# Patient Record
Sex: MALE | Race: WHITE | NOT HISPANIC OR LATINO | Employment: FULL TIME | URBAN - METROPOLITAN AREA
[De-identification: names, ages, dates, MRNs, and addresses within clinical notes are randomized per-mention and may not be internally consistent; named-entity substitution may affect disease eponyms.]

---

## 2023-04-19 LAB
ALANINE AMINOTRANSFERASE (SGPT) (U/L) IN SER/PLAS: 35 U/L (ref 10–52)
ALBUMIN (G/DL) IN SER/PLAS: 5 G/DL (ref 3.4–5)
ALKALINE PHOSPHATASE (U/L) IN SER/PLAS: 50 U/L (ref 33–120)
ANION GAP IN SER/PLAS: 13 MMOL/L (ref 10–20)
ASPARTATE AMINOTRANSFERASE (SGOT) (U/L) IN SER/PLAS: 33 U/L (ref 9–39)
BILIRUBIN TOTAL (MG/DL) IN SER/PLAS: 0.9 MG/DL (ref 0–1.2)
CALCIUM (MG/DL) IN SER/PLAS: 10.2 MG/DL (ref 8.6–10.6)
CARBON DIOXIDE, TOTAL (MMOL/L) IN SER/PLAS: 32 MMOL/L (ref 21–32)
CHLORIDE (MMOL/L) IN SER/PLAS: 99 MMOL/L (ref 98–107)
CHOLESTEROL (MG/DL) IN SER/PLAS: 204 MG/DL (ref 0–199)
CHOLESTEROL IN HDL (MG/DL) IN SER/PLAS: 59.2 MG/DL
CHOLESTEROL/HDL RATIO: 3.4
CREATININE (MG/DL) IN SER/PLAS: 1.19 MG/DL (ref 0.5–1.3)
ESTIMATED AVERAGE GLUCOSE FOR HBA1C: 108 MG/DL
GFR MALE: 81 ML/MIN/1.73M2
GLUCOSE (MG/DL) IN SER/PLAS: 87 MG/DL (ref 74–99)
HEMOGLOBIN A1C/HEMOGLOBIN TOTAL IN BLOOD: 5.4 %
LDL: 135 MG/DL (ref 0–99)
POTASSIUM (MMOL/L) IN SER/PLAS: 4.5 MMOL/L (ref 3.5–5.3)
PROTEIN TOTAL: 7.7 G/DL (ref 6.4–8.2)
SODIUM (MMOL/L) IN SER/PLAS: 139 MMOL/L (ref 136–145)
TRIGLYCERIDE (MG/DL) IN SER/PLAS: 50 MG/DL (ref 0–149)
UREA NITROGEN (MG/DL) IN SER/PLAS: 20 MG/DL (ref 6–23)
VLDL: 10 MG/DL (ref 0–40)

## 2023-12-19 ENCOUNTER — OFFICE VISIT (OUTPATIENT)
Dept: PRIMARY CARE | Facility: CLINIC | Age: 38
End: 2023-12-19
Payer: COMMERCIAL

## 2023-12-19 VITALS
RESPIRATION RATE: 16 BRPM | WEIGHT: 202.38 LBS | BODY MASS INDEX: 28.97 KG/M2 | SYSTOLIC BLOOD PRESSURE: 122 MMHG | OXYGEN SATURATION: 98 % | DIASTOLIC BLOOD PRESSURE: 80 MMHG | HEIGHT: 70 IN | HEART RATE: 56 BPM

## 2023-12-19 DIAGNOSIS — Z90.5 HISTORY OF NEPHRECTOMY: Primary | ICD-10-CM

## 2023-12-19 DIAGNOSIS — G57.90 NEUROPATHY OF LOWER EXTREMITY, UNSPECIFIED LATERALITY: ICD-10-CM

## 2023-12-19 PROCEDURE — 99213 OFFICE O/P EST LOW 20 MIN: CPT | Performed by: INTERNAL MEDICINE

## 2023-12-19 PROCEDURE — 1036F TOBACCO NON-USER: CPT | Performed by: INTERNAL MEDICINE

## 2023-12-19 NOTE — PROGRESS NOTES
"Office Visit / leg leg tingling     Darryl is a very pleasant 38-year-old male here for 2 separate issues.  He has history of right nephrectomy many years ago, he exercises, hydrates, takes only occasional plant-based protein supplements but no daily protein supplements.  He wants to get his kidney function checked twice a year for screening.  Does not take any NSAIDs.    His second concern is tingling of the left leg.  He does a lot of exercising including weightlifting, running, he has noticed that days that he exercises he has some tingling of the lateral thigh.  This comes after exercise.  No symptoms during exercise.  Has no symptoms days that he does not exercise.  Denies any joint pain.  No muscle aches.  No weakness.  No numbness.  No back pain no neck pain.  No upper extremity numbness tingling weakness.  Otherwise he is doing well and has no complaints.  Eats a healthy diet.  Exercises.    No fevers no chills, no weight changes.    No URI symptoms    No cough no shortness of breath    No chest pain no palpitations    Appetite intact, no abdominal pain.    No new or unusual headaches.    Vitals and exam:Blood pressure 122/80, pulse 56, resp. rate 16, height 1.778 m (5' 10\"), weight 91.8 kg (202 lb 6.1 oz), SpO2 98 %.  Calm coherent and appropriate    Neck is supple and none tender    Breathing comfortably, clear to auscultation bilaterally    Regular rate rhythm, no murmur gallops or rubs.  Good distal pulses.  No edema.  No JVD.    Abdomen soft and nontender, normal bowel sounds.    Extremities warm well perfused with no clubbing or cyanosis    Cognition intact.    Sensation to fine touch and pain is intact in both legs and hands arms.  Strength intact upper and lower extremities.  Patellar reflex 1+ bilaterally.  He does have poor hip extension and flexion mobility.  Otherwise joints intact.  Cranial nerves II through XII intact.    Assessment plan: Darryl is a pleasant 38-year-old male with history of " right nephrectomy who is here for follow-up:    History of nephrectomy: Has a very healthy lifestyle, hydrates, does not take any NSAIDs, I think it is perfectly fine to check his kidney function twice a year for his own reassurance otherwise once a year will also suffice.    Subjective tingling: Most consistent with nerve impingement due to poor hip mobility.  No concern for neurodeficit based on exam.  We discussed doing some hip mobility exercises at home, if symptoms do not resolve he will call and we will refer to physical therapy.  Call with any new symptoms.

## 2024-01-03 ENCOUNTER — TELEPHONE (OUTPATIENT)
Dept: PRIMARY CARE | Facility: CLINIC | Age: 39
End: 2024-01-03
Payer: COMMERCIAL

## 2024-01-05 ENCOUNTER — PHARMACY VISIT (OUTPATIENT)
Dept: PHARMACY | Facility: CLINIC | Age: 39
End: 2024-01-05
Payer: COMMERCIAL

## 2024-01-05 PROCEDURE — RXMED WILLOW AMBULATORY MEDICATION CHARGE

## 2024-01-05 RX ORDER — METHYLPREDNISOLONE 4 MG/1
TABLET ORAL
Qty: 21 TABLET | Refills: 0 | OUTPATIENT
Start: 2024-01-05

## 2024-01-05 RX ORDER — AMOXICILLIN 875 MG/1
875 TABLET, FILM COATED ORAL 2 TIMES DAILY
Qty: 20 TABLET | Refills: 0 | OUTPATIENT
Start: 2024-01-05

## 2024-01-13 ENCOUNTER — ANCILLARY PROCEDURE (OUTPATIENT)
Dept: RADIOLOGY | Facility: CLINIC | Age: 39
End: 2024-01-13
Payer: COMMERCIAL

## 2024-01-13 DIAGNOSIS — R05.9 COUGH, UNSPECIFIED TYPE: ICD-10-CM

## 2024-01-13 PROCEDURE — 71046 X-RAY EXAM CHEST 2 VIEWS: CPT

## 2024-01-13 PROCEDURE — 71046 X-RAY EXAM CHEST 2 VIEWS: CPT | Performed by: RADIOLOGY

## 2024-01-19 ENCOUNTER — OFFICE VISIT (OUTPATIENT)
Dept: PRIMARY CARE | Facility: CLINIC | Age: 39
End: 2024-01-19
Payer: COMMERCIAL

## 2024-01-19 VITALS — RESPIRATION RATE: 14 BRPM | OXYGEN SATURATION: 98 % | HEART RATE: 64 BPM

## 2024-01-19 DIAGNOSIS — J18.9 COMMUNITY ACQUIRED PNEUMONIA, UNSPECIFIED LATERALITY: Primary | ICD-10-CM

## 2024-01-19 PROCEDURE — 1036F TOBACCO NON-USER: CPT | Performed by: INTERNAL MEDICINE

## 2024-01-19 PROCEDURE — 99213 OFFICE O/P EST LOW 20 MIN: CPT | Performed by: INTERNAL MEDICINE

## 2024-01-19 NOTE — PROGRESS NOTES
Darryl is a pleasant 38-year-old male who had URI symptoms around [place], this progressed to pneumonia, seen in urgent care, treated with steroids, Z-Yon and Augmentin, he just finished a Z-Yon and is a little of his Augmentin.  He is feeling much better.  His cough is much better.  Initially he had a lot of shortness of breath that is now resolved.  No chest pain.  No fevers no chills.  Energy coming back to normal.  He wants to know if he can start exercising.  No sinus pain no sore throat no headache no neck pain.  Appetite intact.  No nausea vomiting diarrhea.  No dizziness lightheadedness.  No night sweats.    Pulse 64, resp. rate 14, SpO2 98 %.  Blood pressure 120/68.    Coherent, well appearing,     Neck is supple and none tender, oropharynx clear, sinuses nontender    Breathing comfortably, clear to auscultation bilaterally, breathing comfortably     Regular rate rhythm, no murmur gallops or rubs.  Good distal pulses.  No edema.  No JVD.    Abdomen soft and nontender, normal bowel sounds.    No cervical  lymphadenopathy.    Extremities warm well perfused with no clubbing or cyanosis    Cognition intact.    Assessment plan: Darryl is a pleasant 38-year-old male who had a viral URI, complicated by community-acquired pneumonia treated with steroids and Z-Yon and Augmentin, he is currently finishing his Augmentin.  He is feeling much better.  Exam is reassuring.  May resume normal activity gradually as tolerated.

## 2024-02-12 DIAGNOSIS — Z30.09 VASECTOMY EVALUATION: Primary | ICD-10-CM

## 2024-02-22 ENCOUNTER — LAB (OUTPATIENT)
Dept: LAB | Facility: LAB | Age: 39
End: 2024-02-22
Payer: COMMERCIAL

## 2024-02-22 ENCOUNTER — OFFICE VISIT (OUTPATIENT)
Dept: UROLOGY | Facility: HOSPITAL | Age: 39
End: 2024-02-22
Payer: COMMERCIAL

## 2024-02-22 DIAGNOSIS — Z30.09 VASECTOMY EVALUATION: ICD-10-CM

## 2024-02-22 DIAGNOSIS — Z30.09 VASECTOMY EVALUATION: Primary | ICD-10-CM

## 2024-02-22 LAB
ANION GAP SERPL CALC-SCNC: 13 MMOL/L (ref 10–20)
BUN SERPL-MCNC: 26 MG/DL (ref 6–23)
CALCIUM SERPL-MCNC: 9.7 MG/DL (ref 8.6–10.3)
CHLORIDE SERPL-SCNC: 100 MMOL/L (ref 98–107)
CO2 SERPL-SCNC: 28 MMOL/L (ref 21–32)
CREAT SERPL-MCNC: 0.99 MG/DL (ref 0.5–1.3)
EGFRCR SERPLBLD CKD-EPI 2021: >90 ML/MIN/1.73M*2
GLUCOSE SERPL-MCNC: 88 MG/DL (ref 74–99)
POC APPEARANCE, URINE: CLEAR
POC BILIRUBIN, URINE: NEGATIVE
POC BLOOD, URINE: ABNORMAL
POC COLOR, URINE: YELLOW
POC GLUCOSE, URINE: NEGATIVE MG/DL
POC KETONES, URINE: NEGATIVE MG/DL
POC LEUKOCYTES, URINE: NEGATIVE
POC NITRITE,URINE: NEGATIVE
POC PH, URINE: 6 PH
POC PROTEIN, URINE: NEGATIVE MG/DL
POC SPECIFIC GRAVITY, URINE: 1.02
POC UROBILINOGEN, URINE: 0.2 EU/DL
POTASSIUM SERPL-SCNC: 4.2 MMOL/L (ref 3.5–5.3)
SODIUM SERPL-SCNC: 137 MMOL/L (ref 136–145)

## 2024-02-22 PROCEDURE — 99214 OFFICE O/P EST MOD 30 MIN: CPT | Performed by: STUDENT IN AN ORGANIZED HEALTH CARE EDUCATION/TRAINING PROGRAM

## 2024-02-22 PROCEDURE — 81003 URINALYSIS AUTO W/O SCOPE: CPT | Mod: 91 | Performed by: STUDENT IN AN ORGANIZED HEALTH CARE EDUCATION/TRAINING PROGRAM

## 2024-02-22 PROCEDURE — 80048 BASIC METABOLIC PNL TOTAL CA: CPT

## 2024-02-22 PROCEDURE — 99204 OFFICE O/P NEW MOD 45 MIN: CPT | Performed by: STUDENT IN AN ORGANIZED HEALTH CARE EDUCATION/TRAINING PROGRAM

## 2024-02-22 PROCEDURE — 1036F TOBACCO NON-USER: CPT | Performed by: STUDENT IN AN ORGANIZED HEALTH CARE EDUCATION/TRAINING PROGRAM

## 2024-02-22 PROCEDURE — 36415 COLL VENOUS BLD VENIPUNCTURE: CPT

## 2024-02-22 NOTE — PROGRESS NOTES
Subjective   Patient ID: Darryl Payne is a 38 y.o. male    HPI  38 y.o. 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 He reports no recent issues with the remaining kidney but has not had recent renal ultrasound or kidney function tests. The patient is considering a vasectomy and is seeking counseling on the procedure. He has erectile dysfunction or other urological complaints. He denies any use of NSAIDs, anabolic steroids, or creatine. He maintains good hydration with at least 100 ounces of water daily. The patient also reports a severe illness in January, during which he alternated Tylenol and Advil to manage fever. He is currently not on any blood pressure medication and has no known drug allergies.  the patient is  with 2 daughters, I had a long and extensive discussion with the patient regarding vasectomy. I informed him that he should consider this procedure as permanent and he should be 100% sure about proceeding with it. I explained to him in detail the steps of the vasectomy, I also had a long discussion with him regarding the possible side effects including infection, hematoma, bleeding, chronic pain, testicular congestion, injury to surrounding structure. We also discussed the multiple studies linking vasectomy to prostate cancer I explained to him the correlation between this procedure and prostate cancer. The patient verbalized understanding of all the risks and benefits and would like to proceed. I explained to him that there is a small chance of spontaneous return of the semen because of reconnection of his vas ending. I also told him that he is not close to the sterile unless it is proven by a post vasectomy semen analysis after around 3 months. I also explained to him that some man with a much longer to clear his semen from there, this most could take  up to 6 months during which he would be still able to proceed.     The most recent urinalysis, conducted on 2/22/2024, revealed Trace intact blood     Review of Systems    All systems were reviewed. Anything negative was noted in the HPI.    Objective   Physical Exam    General: Well developed, well nourished, alert and cooperative, appears in no acute distress   Eyes: Non-injected conjunctiva, sclera clear, no proptosis   Cardiac: Extremities are warm and well perfused. No edema, cyanosis or pallor   Lungs: Breathing is easy, non-labored. Speaking in clear and complete sentences. Normal diaphragmatic movement   MSK: Ambulatory with steady gait, unassisted   Neuro: Alert and oriented to person, place, and time   Psych: Demonstrates good judgment and reason, without hallucinations, abnormal affect or abnormal behaviors   Skin: No obvious lesions, no rashes       No CVA tenderness bilaterally   No suprapubic pain or discomfort   Normal genitalia , normal testicular exam, normal JULIETTE    Past Medical History:   Diagnosis Date    Contact with and (suspected) exposure to covid-19 08/05/2020    Close exposure to COVID-19 virus    Partial loss of teeth, unspecified cause, unspecified class     [place] teeth removed    Unspecified abdominal pain 06/23/2020    Abdominal pain of multiple sites    Unspecified asthma, uncomplicated     Childhood asthma    Viral infection, unspecified 08/05/2020    Nonspecific syndrome suggestive of viral illness         Past Surgical History:   Procedure Laterality Date    OTHER SURGICAL HISTORY  07/22/2019    Kidney surgery    OTHER SURGICAL HISTORY  04/04/2022    Nephrectomy       Assessment/Plan   Vasectomy counseling, Erectile dysfunction, Uninephric, carrier of high risk genes for malignancy     38 y.o. male who presents for the above conditions,  1- Vasectomy Evaluation: Patient presents today for evaluation of vasectomy. We had an extensive discussion about the procedure and post-procedure  protocol. We discussed that vasectomy is intended to be a permanent form of contraception. There are options for fertility post vasectomy, including vasectomy reversal and sperm retrieval but these are not always successful and can be rather expensive.  We highlighted that it is not an immediate form of contraception, and that another form is required until vas occlusion is confirmed with a post-vasectomy semen analysis. We recommend that the patient refrain from ejaculation for 1 week post-procedure and we would be checking a post-vasectomy semen analysis in 2-3 months, or 15-20 ejaculates. Need for repeat vasectomy is very low, <1%. There is a very small risk for pregnancy after vasectomy (1 in 2,000). We define a successful vasectomy by achieving azoospermia or less than 100,000 non-motile sperm on post-vasectomy semen analysis.  We discussed that the procedure would be done in the office under local anesthesia. Complications were discussed including but not limited to pain, which can be chronic in nature, bleeding/hematoma formation, and infection. We recommended ice and rest for the next 48-72hrs. Patient may be prescribed an anti-inflammatory for pain control. Patient is instructed to refrain from strenuous activity for 2 weeks.  No barriers to learning were identified. After all of the patient’s questions were satisfactorily answered, he expressed understanding of the risks of surgery and wishes to proceed with vasectomy.    2- Erectile dysfunction: We had a very long and extensive discussion with the patient regarding the pathophysiology, differential diagnosis, risk factor, and management of ED. We discussed at length mechanism of action, risk, benefit, potential complication. Pt decided to not do anything for his ED.     Plan:  - Renal US for left kidney  - BMP  - Schedule for vasectomy              Scribe Attestation  By signing my name below, IZachary Scribe   attest that this documentation has  been prepared under the direction and in the presence of Dr. Benny Wall

## 2024-02-29 ENCOUNTER — PROCEDURE VISIT (OUTPATIENT)
Dept: UROLOGY | Facility: HOSPITAL | Age: 39
End: 2024-02-29
Payer: COMMERCIAL

## 2024-02-29 DIAGNOSIS — Z30.09 VASECTOMY EVALUATION: ICD-10-CM

## 2024-02-29 DIAGNOSIS — Z98.52 STATUS POST VASECTOMY: Primary | ICD-10-CM

## 2024-02-29 PROCEDURE — 55250 REMOVAL OF SPERM DUCT(S): CPT | Performed by: STUDENT IN AN ORGANIZED HEALTH CARE EDUCATION/TRAINING PROGRAM

## 2024-02-29 PROCEDURE — 88302 TISSUE EXAM BY PATHOLOGIST: CPT | Performed by: PATHOLOGY

## 2024-02-29 PROCEDURE — 88302 TISSUE EXAM BY PATHOLOGIST: CPT | Mod: TC,SUR,AHULAB | Performed by: STUDENT IN AN ORGANIZED HEALTH CARE EDUCATION/TRAINING PROGRAM

## 2024-02-29 NOTE — PROGRESS NOTES
Patient ID: Darryl Payne is a 38 y.o. male.    Vasectomy    Date/Time: 2/29/2024 2:06 PM    Performed by: Benny Wall MD MPH  Authorized by: Benny Wall MD MPH      Procedure discussed: discussed risks, benefits, and alternatives    Chaperone present: no    Timeout: timeout called immediately prior to procedure    Prep: patient was prepped and draped in usual sterile fashion    Prep type: Betadine    Anesthesia: local anesthesia used    Local anesthetic: lidocaine without epinephrine    Procedure Details:     Indications: desire for sterilization      Laterality: bilateral      Incisions: 2      Right Vas Deferens:      Divided: yes        Hemoclips applied: yes        Cauterized: yes         Left Vas Deferens:      Divided: yes        Hemoclips applied: yes        Cauterized: yes          Skin closure: suture     Closure suture type: 3-0 chromic gut    Post-Procedure Details:     Pathology sent to lab for analysis: yes      Outcome: patient tolerated procedure well with no complications      Post-procedure interventions: sterile dressing applied and wound care instructions given      Dressing type: antibiotic ointment and scrotal support      Plan  Fu in 12 weeks with post Vas SA.

## 2024-03-05 ENCOUNTER — HOSPITAL ENCOUNTER (OUTPATIENT)
Dept: RADIOLOGY | Facility: HOSPITAL | Age: 39
Discharge: HOME | End: 2024-03-05
Payer: COMMERCIAL

## 2024-03-05 DIAGNOSIS — Z30.09 VASECTOMY EVALUATION: ICD-10-CM

## 2024-03-05 LAB
LABORATORY COMMENT REPORT: NORMAL
PATH REPORT.FINAL DX SPEC: NORMAL
PATH REPORT.GROSS SPEC: NORMAL
PATH REPORT.RELEVANT HX SPEC: NORMAL
PATH REPORT.TOTAL CANCER: NORMAL

## 2024-03-05 PROCEDURE — 76770 US EXAM ABDO BACK WALL COMP: CPT

## 2024-03-08 ENCOUNTER — TELEPHONE (OUTPATIENT)
Dept: UROLOGY | Facility: HOSPITAL | Age: 39
End: 2024-03-08
Payer: COMMERCIAL

## 2024-03-08 NOTE — TELEPHONE ENCOUNTER
----- Message from Livier Jasmine MA sent at 3/8/2024  8:44 AM EST -----  Regarding: FW: Vasectomy follow up   Contact: [phone]    ----- Message -----  From: Darryl Payne  Sent: 3/8/2024   8:21 AM EST  To: [facility] Clinical Support Staff  Subject: Vasectomy follow up                              Morning!  Recovery has been going well 1 week out but had a question.      Left side of the scrotum feels ~90% back to normal.  Right side maybe 60-70% and still feels sore and more swollen than left with a discomfort that kind of feels similar to chafing even though I’m not actually chafing; my assumption is thats not abnormal one week out?    No bleeding observed after the first 24-48 hours and I am going to avoid lifting or running for a few more days since both seem like they would be uncomfortable.    Thanks!  Have a good weekend

## 2024-05-28 ENCOUNTER — PATIENT MESSAGE (OUTPATIENT)
Dept: UROLOGY | Facility: HOSPITAL | Age: 39
End: 2024-05-28
Payer: COMMERCIAL

## 2024-05-28 DIAGNOSIS — N52.9 ERECTILE DYSFUNCTION, UNSPECIFIED ERECTILE DYSFUNCTION TYPE: Primary | ICD-10-CM

## 2024-05-28 RX ORDER — TADALAFIL 5 MG/1
5 TABLET ORAL DAILY
Qty: 30 TABLET | Refills: 2 | Status: SHIPPED | OUTPATIENT
Start: 2024-05-28 | End: 2024-08-26

## 2024-05-29 ENCOUNTER — ANCILLARY PROCEDURE (OUTPATIENT)
Dept: ENDOCRINOLOGY | Facility: CLINIC | Age: 39
End: 2024-05-29
Payer: COMMERCIAL

## 2024-05-29 DIAGNOSIS — Z98.52 STATUS POST VASECTOMY: ICD-10-CM

## 2024-05-29 DIAGNOSIS — N52.9 ERECTILE DYSFUNCTION, UNSPECIFIED ERECTILE DYSFUNCTION TYPE: Primary | ICD-10-CM

## 2024-05-29 LAB
ABSTINENCE (DAYS): 3 DAYS (ref 2–7)
AGGLUTINATION (SEMEN): NO
ANALYZED TIME:: ABNORMAL
ANDROLOGY LAB ID#: ABNORMAL
CLUMPS (SEMEN): NO
COLLECTED COMPLETELY: YES
COLLECTION LOCATION:: ABNORMAL
COLLECTION METHOD:: ABNORMAL
CONCENTRATION CN (POST-WASH): 0 MILL/ML
CONCENTRATION(SEMEN): 0 MILL/ML (ref 15–?)
DEBRIS (SEMEN): YES
LEUKOCYTE (SEMEN): ABNORMAL
NON PROG. MOTILITY (SEMEN): 0 %
NON PROG. MOTILITY CN (POST-WASH): 0 %
PROG. MOTILITY (SEMEN): 0 % (ref 32–?)
PROG. MOTILITY CN (POST-WASH): 0 %
RECEIVED TIME:: ABNORMAL
SEMEN APPEARANCE: NORMAL
SEMEN LIQUEFACTION: NORMAL
SEMEN VISCOSITY: ABNORMAL
TOTAL MOTILITY (SEMEN): 0 % (ref 40–?)
TOTAL MOTILITY CN (POST-WASH): 0 %
TOTAL NO OF MOTILE (SEMEN): 0 MILL
TOTAL NO OF MOTILE CN (POST-WASH): 0 MILL
TOTAL NO OF RND CELLS (SEMEN): 0 MILL (ref ?–5)
TOTAL NO OF SPERM (SEMEN): 0 MILL (ref 39–?)
TOTAL NO OF SPERM CN (POST-WASH): 0 MILL
VOLUME (SEMEN): 4.9 ML (ref 1.5–?)
VOLUME CN (POST-WASH): 0.2 ML

## 2024-05-29 PROCEDURE — 89321 SEMEN ANAL SPERM DETECTION: CPT | Performed by: OBSTETRICS & GYNECOLOGY

## 2024-05-29 RX ORDER — TADALAFIL 5 MG/1
5 TABLET ORAL DAILY
Qty: 30 TABLET | Refills: 3 | Status: SHIPPED | OUTPATIENT
Start: 2024-05-29 | End: 2024-09-26

## 2024-09-25 ENCOUNTER — OFFICE VISIT (OUTPATIENT)
Dept: PRIMARY CARE | Facility: CLINIC | Age: 39
End: 2024-09-25
Payer: COMMERCIAL

## 2024-09-25 VITALS
OXYGEN SATURATION: 98 % | HEART RATE: 65 BPM | SYSTOLIC BLOOD PRESSURE: 124 MMHG | BODY MASS INDEX: 26.83 KG/M2 | HEIGHT: 70 IN | DIASTOLIC BLOOD PRESSURE: 78 MMHG | WEIGHT: 187.39 LBS

## 2024-09-25 DIAGNOSIS — Z00.00 ANNUAL PHYSICAL EXAM: Primary | ICD-10-CM

## 2024-09-25 DIAGNOSIS — Z15.09 BRCA1 GENETIC CARRIER: ICD-10-CM

## 2024-09-25 DIAGNOSIS — Z12.31 BREAST CANCER SCREENING BY MAMMOGRAM: ICD-10-CM

## 2024-09-25 DIAGNOSIS — Z15.01 BRCA1 GENETIC CARRIER: ICD-10-CM

## 2024-09-25 PROCEDURE — 99395 PREV VISIT EST AGE 18-39: CPT | Performed by: NURSE PRACTITIONER

## 2024-09-25 PROCEDURE — 3008F BODY MASS INDEX DOCD: CPT | Performed by: NURSE PRACTITIONER

## 2024-09-25 PROCEDURE — 1036F TOBACCO NON-USER: CPT | Performed by: NURSE PRACTITIONER

## 2024-09-25 ASSESSMENT — PATIENT HEALTH QUESTIONNAIRE - PHQ9
2. FEELING DOWN, DEPRESSED OR HOPELESS: NOT AT ALL
SUM OF ALL RESPONSES TO PHQ9 QUESTIONS 1 AND 2: 0
1. LITTLE INTEREST OR PLEASURE IN DOING THINGS: NOT AT ALL

## 2024-09-25 ASSESSMENT — ENCOUNTER SYMPTOMS
FEVER: 0
ENDOCRINE NEGATIVE: 1
ABDOMINAL PAIN: 0
JOINT SWELLING: 0
CONSTIPATION: 0
SHORTNESS OF BREATH: 0
FREQUENCY: 0
HEMATURIA: 0
LOSS OF SENSATION IN FEET: 0
COUGH: 0
SLEEP DISTURBANCE: 0
VOMITING: 0
OCCASIONAL FEELINGS OF UNSTEADINESS: 0
WOUND: 0
NAUSEA: 0
DIARRHEA: 0
DEPRESSION: 0
SEIZURES: 0
EYE PAIN: 0
UNEXPECTED WEIGHT CHANGE: 0
PALPITATIONS: 0
DYSURIA: 0
BLOOD IN STOOL: 0
DIZZINESS: 0
ACTIVITY CHANGE: 0
SORE THROAT: 0
RHINORRHEA: 0
DYSPHORIC MOOD: 0

## 2024-09-25 ASSESSMENT — PROMIS GLOBAL HEALTH SCALE
RATE_AVERAGE_PAIN: 2
CARRYOUT_SOCIAL_ACTIVITIES: EXCELLENT
EMOTIONAL_PROBLEMS: RARELY
RATE_AVERAGE_FATIGUE: MILD
RATE_PHYSICAL_HEALTH: EXCELLENT
RATE_MENTAL_HEALTH: EXCELLENT
CARRYOUT_PHYSICAL_ACTIVITIES: COMPLETELY
RATE_GENERAL_HEALTH: VERY GOOD
RATE_QUALITY_OF_LIFE: EXCELLENT
RATE_SOCIAL_SATISFACTION: EXCELLENT

## 2024-09-25 NOTE — PROGRESS NOTES
"Subjective   Patient ID: Darryl Payne is a 39 y.o. male who presents for Annual Exam. He is doing well, no major concerns.     HPI     Solitary L kidney: s/p nephrectomy in college ~2007; renal US normal 03/2024. Kidney function has been consistently normal.   SOCIAL: living at home with wife and daughters  No tobacco use  Social alcohol use  Regular exercise  Review of Systems   Constitutional:  Negative for activity change, fever and unexpected weight change.   HENT:  Negative for ear pain, hearing loss, rhinorrhea and sore throat.    Eyes:  Negative for pain and visual disturbance.   Respiratory:  Negative for cough and shortness of breath.    Cardiovascular:  Negative for chest pain, palpitations and leg swelling.   Gastrointestinal:  Negative for abdominal pain, blood in stool, constipation, diarrhea, nausea and vomiting.   Endocrine: Negative.    Genitourinary:  Negative for dysuria, frequency and hematuria.   Musculoskeletal:  Negative for gait problem and joint swelling.   Skin:  Negative for rash and wound.   Allergic/Immunologic: Negative for immunocompromised state.   Neurological:  Negative for dizziness, seizures and syncope.   Psychiatric/Behavioral:  Negative for behavioral problems, dysphoric mood and sleep disturbance.      Objective   /78   Pulse 65   Ht 1.778 m (5' 10\")   Wt 85 kg (187 lb 6.3 oz)   SpO2 98%   BMI 26.89 kg/m²     Physical Exam  Constitutional:       Appearance: Normal appearance.   HENT:      Head: Normocephalic.      Right Ear: Tympanic membrane, ear canal and external ear normal.      Left Ear: Tympanic membrane, ear canal and external ear normal.      Nose: Nose normal.      Mouth/Throat:      Mouth: Mucous membranes are moist.      Pharynx: Oropharynx is clear.   Eyes:      Extraocular Movements: Extraocular movements intact.      Conjunctiva/sclera: Conjunctivae normal.      Pupils: Pupils are equal, round, and reactive to light.   Cardiovascular:      Rate and " Rhythm: Normal rate and regular rhythm.      Pulses: Normal pulses.      Heart sounds: Normal heart sounds.   Pulmonary:      Effort: Pulmonary effort is normal.      Breath sounds: Normal breath sounds.   Abdominal:      General: Abdomen is flat. Bowel sounds are normal.      Palpations: Abdomen is soft.   Musculoskeletal:         General: Normal range of motion.   Skin:     General: Skin is warm and dry.   Neurological:      General: No focal deficit present.      Mental Status: He is alert and oriented to person, place, and time.   Psychiatric:         Mood and Affect: Mood normal.         Behavior: Behavior normal.         Assessment/Plan   Diagnoses and all orders for this visit:  Annual physical exam  -     Comprehensive Metabolic Panel; Future  -     CBC; Future  -     Hemoglobin A1C; Future  -     Lipid Panel; Future  -     Vitamin D 25-Hydroxy,Total (for eval of Vitamin D levels); Future  -     Thyroid Stimulating Hormone; Future  Breast cancer screening by mammogram  -     fhx brca; s/p gene testing in [place] with positive BRCA gene.   BI mammo bilateral screening tomosynthesis; Future    Health maintenance: Declined flu vaccine   FU annually for CPE due 9/25/2025

## 2024-10-02 ENCOUNTER — LAB (OUTPATIENT)
Dept: LAB | Facility: LAB | Age: 39
End: 2024-10-02
Payer: COMMERCIAL

## 2024-10-02 ENCOUNTER — HOSPITAL ENCOUNTER (OUTPATIENT)
Dept: RADIOLOGY | Facility: CLINIC | Age: 39
Discharge: HOME | End: 2024-10-02
Payer: COMMERCIAL

## 2024-10-02 DIAGNOSIS — Z00.00 ANNUAL PHYSICAL EXAM: ICD-10-CM

## 2024-10-02 DIAGNOSIS — Z12.31 BREAST CANCER SCREENING BY MAMMOGRAM: ICD-10-CM

## 2024-10-02 LAB
25(OH)D3 SERPL-MCNC: 40 NG/ML (ref 30–100)
ALBUMIN SERPL BCP-MCNC: 4.6 G/DL (ref 3.4–5)
ALP SERPL-CCNC: 47 U/L (ref 33–120)
ALT SERPL W P-5'-P-CCNC: 25 U/L (ref 10–52)
ANION GAP SERPL CALC-SCNC: 9 MMOL/L (ref 10–20)
AST SERPL W P-5'-P-CCNC: 25 U/L (ref 9–39)
BILIRUB SERPL-MCNC: 0.4 MG/DL (ref 0–1.2)
BUN SERPL-MCNC: 22 MG/DL (ref 6–23)
CALCIUM SERPL-MCNC: 9.1 MG/DL (ref 8.6–10.3)
CHLORIDE SERPL-SCNC: 101 MMOL/L (ref 98–107)
CHOLEST SERPL-MCNC: 204 MG/DL (ref 0–199)
CHOLESTEROL/HDL RATIO: 3.8
CO2 SERPL-SCNC: 32 MMOL/L (ref 21–32)
CREAT SERPL-MCNC: 1.13 MG/DL (ref 0.5–1.3)
EGFRCR SERPLBLD CKD-EPI 2021: 85 ML/MIN/1.73M*2
ERYTHROCYTE [DISTWIDTH] IN BLOOD BY AUTOMATED COUNT: 12.5 % (ref 11.5–14.5)
EST. AVERAGE GLUCOSE BLD GHB EST-MCNC: 114 MG/DL
GLUCOSE SERPL-MCNC: 94 MG/DL (ref 74–99)
HBA1C MFR BLD: 5.6 %
HCT VFR BLD AUTO: 47.7 % (ref 41–52)
HDLC SERPL-MCNC: 54.1 MG/DL
HGB BLD-MCNC: 15.8 G/DL (ref 13.5–17.5)
LDLC SERPL CALC-MCNC: 138 MG/DL
MCH RBC QN AUTO: 28.6 PG (ref 26–34)
MCHC RBC AUTO-ENTMCNC: 33.1 G/DL (ref 32–36)
MCV RBC AUTO: 86 FL (ref 80–100)
NON HDL CHOLESTEROL: 150 MG/DL (ref 0–149)
NRBC BLD-RTO: 0 /100 WBCS (ref 0–0)
PLATELET # BLD AUTO: 210 X10*3/UL (ref 150–450)
POTASSIUM SERPL-SCNC: 4.4 MMOL/L (ref 3.5–5.3)
PROT SERPL-MCNC: 7 G/DL (ref 6.4–8.2)
RBC # BLD AUTO: 5.53 X10*6/UL (ref 4.5–5.9)
SODIUM SERPL-SCNC: 138 MMOL/L (ref 136–145)
TRIGL SERPL-MCNC: 58 MG/DL (ref 0–149)
TSH SERPL-ACNC: 1.32 MIU/L (ref 0.44–3.98)
VLDL: 12 MG/DL (ref 0–40)
WBC # BLD AUTO: 4.9 X10*3/UL (ref 4.4–11.3)

## 2024-10-02 PROCEDURE — 80053 COMPREHEN METABOLIC PANEL: CPT

## 2024-10-02 PROCEDURE — 77062 BREAST TOMOSYNTHESIS BI: CPT

## 2024-10-02 PROCEDURE — 85027 COMPLETE CBC AUTOMATED: CPT

## 2024-10-02 PROCEDURE — 84443 ASSAY THYROID STIM HORMONE: CPT

## 2024-10-02 PROCEDURE — 80061 LIPID PANEL: CPT

## 2024-10-02 PROCEDURE — 36415 COLL VENOUS BLD VENIPUNCTURE: CPT

## 2024-10-13 ENCOUNTER — PATIENT MESSAGE (OUTPATIENT)
Dept: UROLOGY | Facility: HOSPITAL | Age: 39
End: 2024-10-13
Payer: COMMERCIAL

## 2024-10-16 DIAGNOSIS — N52.9 ERECTILE DYSFUNCTION, UNSPECIFIED ERECTILE DYSFUNCTION TYPE: ICD-10-CM

## 2024-10-16 RX ORDER — TADALAFIL 5 MG/1
5 TABLET ORAL DAILY
Qty: 30 TABLET | Refills: 3 | Status: SHIPPED | OUTPATIENT
Start: 2024-10-16 | End: 2025-02-13

## 2024-10-22 ENCOUNTER — APPOINTMENT (OUTPATIENT)
Dept: INTEGRATIVE MEDICINE | Facility: CLINIC | Age: 39
End: 2024-10-22
Payer: COMMERCIAL

## 2024-10-22 DIAGNOSIS — M99.04 SEGMENTAL AND SOMATIC DYSFUNCTION OF SACRAL REGION: ICD-10-CM

## 2024-10-22 DIAGNOSIS — M54.2 CERVICALGIA: ICD-10-CM

## 2024-10-22 DIAGNOSIS — M99.03 SEGMENTAL AND SOMATIC DYSFUNCTION OF LUMBAR REGION: ICD-10-CM

## 2024-10-22 DIAGNOSIS — M54.59 POSTURAL LOW BACK PAIN: ICD-10-CM

## 2024-10-22 DIAGNOSIS — M79.9 DISORDER OF SOFT TISSUE: ICD-10-CM

## 2024-10-22 DIAGNOSIS — M54.59 MECHANICAL LOW BACK PAIN: ICD-10-CM

## 2024-10-22 DIAGNOSIS — M79.9 POSTURAL STRAIN: ICD-10-CM

## 2024-10-22 DIAGNOSIS — M99.01 SEGMENTAL AND SOMATIC DYSFUNCTION OF CERVICAL REGION: Primary | ICD-10-CM

## 2024-10-22 DIAGNOSIS — M54.2 DORSALGIA OF CERVICOTHORACIC REGION: ICD-10-CM

## 2024-10-22 DIAGNOSIS — M99.02 SEGMENTAL AND SOMATIC DYSFUNCTION OF THORACIC REGION: ICD-10-CM

## 2024-10-22 DIAGNOSIS — M54.6 DORSALGIA OF CERVICOTHORACIC REGION: ICD-10-CM

## 2024-10-22 DIAGNOSIS — M79.10 MYALGIA: ICD-10-CM

## 2024-10-22 NOTE — PROGRESS NOTES
"Subjective   Patient ID: Darryl Payne is a 39 y.o. male who presents today, October 22, 2024 for a new patient evaluation and treatment for postural strain from working from home.    (1/20) [facility]  [facility] ED: 1/22/2025    Chiropractic Medicine HPI:  Provacative factors include : sitting, lifting  Palliative factors incude : movement  Pain is described as : tight   Previous treatment for complaint has included : stretching  Patient denies : trauma/accidents/injuries/falls (last 6 months), bladder weakness, bowel weakness, difficulty walking, instability, radiating pain into the distal extremity, catching, clicking, grinding  Patient rates severity of pain at : 1/10 on a numerical pain scale  Completed Oswestry Disability Index prior to visit: yes     Initial exam:   Darryl \"Asael\"Elmer presents for evaluation and treatment for postural strain due to working from home. He states that he will notice tension in his shoulders after sitting for extended periods. He reports that these symptoms became more noticeable about a year ago. He reports that he presents with a secondary complaint of tingling along the lateral aspect of the left thigh. He states that these symptoms occur intermittently and are more noticeable with sitting and laying down. He states that he does have some lateral forearm pain along the right elbow. He states that in March 2024, he felt a \"pop\" when he was helping a friend move a couch. He notes that these symptoms have improved but he will still feel discomfort when performing a reverse curl.       Objective   Review of Systems   Constitutional: Negative.    HENT: Negative.     Eyes: Negative.    Respiratory: Negative.     Cardiovascular: Negative.    Gastrointestinal: Negative.    Musculoskeletal:  Positive for myalgias and neck stiffness.   Neurological:  Positive for numbness.   Hematological: Negative.    Psychiatric/Behavioral: Negative.     All other systems reviewed and are " negative.    Physical Exam  Musculoskeletal:        Arms:       Cervical back: Normal range of motion.        Back:         Legs:    Neurological:      General: No focal deficit present.      Mental Status: He is alert and oriented to person, place, and time.      Cranial Nerves: No dysarthria or facial asymmetry.      Sensory: Sensation is intact.      Motor: Motor function is intact.      Coordination: Coordination is intact.      Gait: Gait is intact.        Spine Musculoskeletal Exam    Gait    Gait is normal.    Inspection    Leg length disparity: right greater than left    Sagittal balance: anterior imbalance    Palpation    Cervical Spine    Cervical spine palpation is normal.    Tenderness: none    Right      Masses: none      Spasms: none      Crepitus: none      Muscle tone: normal    Left      Masses: none      Spasms: none      Crepitus: none      Muscle tone: normal    Thoracolumbar    Thoracolumbar palpation is normal.    Tenderness: none    Right      Masses: none      Spasms: none      Crepitus: none      Muscle tone: normal    Left      Masses: none      Spasms: none      Crepitus: none      Muscle tone: normal    Range of Motion    Cervical Spine    Cervical spine range of motion is normal.        Cervical flexion: normal. Cervical flexion detail: no pain.     Cervical extension: normal. Cervical extension detail: no pain.       Right      Lateral bending: normal. Lateral bending detail: no pain.       Lateral rotation: normal. Lateral rotation detail: no pain.       Left      Lateral bending: normal. Lateral bending detail: no pain.       Lateral rotation: normal. Lateral rotation detail: no pain.      Thoracolumbar    Range of motion is normal.        Flexion: normal. Flexion detail: no pain.     Extension: normal. Extension detail: no pain.       Right      Lateral bending: normal. Lateral bending detail: no pain.       Lateral rotation: normal. Lateral rotation detail: no pain.       Left       Lateral bending: normal. Lateral bending detail: no pain.       Lateral rotation: normal. Lateral rotation detail: no pain.      Strength    Cervical Spine    Cervical spine motor exam is normal.    Thoracolumbar    Thoracolumbar motor exam is normal.       General    Neurological: alert    Orthopedic Tests:  Cervical: Neutral compression Negative.  Maximum compression Bilateral and Negative.  VBI Test Bilateral and Negative.  Thoracic/Lumbar/Sacrum: Lumbosacral Goff's  Bilateral and Negative.  Slump Test Bilateral and Negative.  Yeoman's Bilateral and Negative.  Nachlas' Bilateral and Negative.    Segmental Joint(s): Segmental joint dysfunction was assessed with motion palpation and is identified in the following areas:  Cervical : C2 C6  Thoracic : T2., T6, T7, and T10  Lumbopelvic / Sacral SIJ : L4, L5/S1, and R SIJ    Assessment/Plan   Today's Treatment Included: Spinal manipulation to the following regions of segmental dysfunction identified on examination, using age-appropriate and injury specific force. Segmental Joint(s) Cervical : C2 C4 C5 Segmental Joint(s) Thoracic : T4, T5, T6, and T8 Segmental Joint(s) Lumbopelvic/Sacral SIJ : L4, L5/S1, and R SIJ  Chiropractic manipulation treatment techniques utilized: Diversified CMT  Soft tissue mobilization techniques utilized during treatment: Active Release Technique and IASTM/Graston  Neuromuscular Re-Education (06064): 2 Units; Start time: 11:30a  End time: 12:00p      Soft-tissue mobilization was performed in the following areas:   Cervical paraspinal mm. bilateral, Scalenes bilateral, Upper Trapezius bilateral, and Levator Scap. bilateral  Middle Trapezius bilateral, Rhomboids bilateral, Pectoralis bilateral, Subscapularis bilateral, and Latissimus Dorsi bilateral  Forearm Flexors right, Forearm Extensors right, Brachioradialis right, and Pronator Teres right    Treatment Plan:   The patient and I discussed the risks and benefits of Chiropractic Care. Consent  for care was given both written and orally by the patient.  Based on the patient's subjective complaints along with the examination findings, it is advised that a course of Chiropractic Treatment by initiated in the form of:   Chiropractic Manipulation (CMT)  Neuro-Muscular Re-education  Integrative Dry Needing (IDN)  Chiropractic treatment recommended at a frequency of 2 times per month for 4 months until symptoms are mild or manageable.  The goals of the treatment will be to: decrease pain, increase activity, increase range of motion, reduce neck pain, improve quality of life, reduce arm pain, return to athletic performance, decrease muscular hypertonicity, increase functional capacity, and improve postural strength  The patient tolerated today's treatment with little or no additional discomfort and was instructed to contact the office for questions or concerns.   Follow up as scheduled.

## 2024-10-23 DIAGNOSIS — M54.2 CERVICALGIA: ICD-10-CM

## 2024-10-23 DIAGNOSIS — M79.9 DISORDER OF SOFT TISSUE: ICD-10-CM

## 2024-10-23 DIAGNOSIS — M54.59 POSTURAL LOW BACK PAIN: ICD-10-CM

## 2024-10-23 DIAGNOSIS — M99.03 SEGMENTAL AND SOMATIC DYSFUNCTION OF LUMBAR REGION: ICD-10-CM

## 2024-10-23 DIAGNOSIS — M99.02 SEGMENTAL AND SOMATIC DYSFUNCTION OF THORACIC REGION: ICD-10-CM

## 2024-10-23 DIAGNOSIS — M79.10 MYALGIA: ICD-10-CM

## 2024-10-23 DIAGNOSIS — M99.01 SEGMENTAL AND SOMATIC DYSFUNCTION OF CERVICAL REGION: Primary | ICD-10-CM

## 2024-10-25 ASSESSMENT — ENCOUNTER SYMPTOMS
GASTROINTESTINAL NEGATIVE: 1
NECK STIFFNESS: 1
CONSTITUTIONAL NEGATIVE: 1
CARDIOVASCULAR NEGATIVE: 1
NUMBNESS: 1
RESPIRATORY NEGATIVE: 1
MYALGIAS: 1
EYES NEGATIVE: 1
HEMATOLOGIC/LYMPHATIC NEGATIVE: 1
PSYCHIATRIC NEGATIVE: 1

## 2024-11-11 DIAGNOSIS — Z15.09 BRCA2 GENE MUTATION POSITIVE IN MALE: Primary | ICD-10-CM

## 2024-11-11 DIAGNOSIS — Z15.03 BRCA2 GENE MUTATION POSITIVE IN MALE: Primary | ICD-10-CM

## 2024-11-11 DIAGNOSIS — Z15.01 BRCA2 GENE MUTATION POSITIVE IN MALE: Primary | ICD-10-CM

## 2024-11-12 ENCOUNTER — APPOINTMENT (OUTPATIENT)
Dept: INTEGRATIVE MEDICINE | Facility: CLINIC | Age: 39
End: 2024-11-12
Payer: COMMERCIAL

## 2024-11-19 ENCOUNTER — APPOINTMENT (OUTPATIENT)
Dept: PRIMARY CARE | Facility: CLINIC | Age: 39
End: 2024-11-19
Payer: COMMERCIAL

## 2024-12-03 ENCOUNTER — APPOINTMENT (OUTPATIENT)
Dept: INTEGRATIVE MEDICINE | Facility: CLINIC | Age: 39
End: 2024-12-03
Payer: COMMERCIAL

## 2024-12-03 DIAGNOSIS — M54.59 MECHANICAL LOW BACK PAIN: ICD-10-CM

## 2024-12-03 DIAGNOSIS — M79.10 MYALGIA: ICD-10-CM

## 2024-12-03 DIAGNOSIS — M99.03 SEGMENTAL AND SOMATIC DYSFUNCTION OF LUMBAR REGION: ICD-10-CM

## 2024-12-03 DIAGNOSIS — M99.01 SEGMENTAL AND SOMATIC DYSFUNCTION OF CERVICAL REGION: Primary | ICD-10-CM

## 2024-12-03 DIAGNOSIS — M99.04 SEGMENTAL AND SOMATIC DYSFUNCTION OF SACRAL REGION: ICD-10-CM

## 2024-12-03 DIAGNOSIS — M79.9 POSTURAL STRAIN: ICD-10-CM

## 2024-12-03 DIAGNOSIS — M54.2 DORSALGIA OF CERVICOTHORACIC REGION: ICD-10-CM

## 2024-12-03 DIAGNOSIS — M99.02 SEGMENTAL AND SOMATIC DYSFUNCTION OF THORACIC REGION: ICD-10-CM

## 2024-12-03 DIAGNOSIS — M54.6 DORSALGIA OF CERVICOTHORACIC REGION: ICD-10-CM

## 2024-12-03 PROCEDURE — 98941 CHIROPRACT MANJ 3-4 REGIONS: CPT | Performed by: CHIROPRACTOR

## 2024-12-03 PROCEDURE — 97112 NEUROMUSCULAR REEDUCATION: CPT | Performed by: CHIROPRACTOR

## 2024-12-03 NOTE — PROGRESS NOTES
"Subjective   Patient ID: Darryl Payne is a 39 y.o. male who presents December 3, 2024 for chiropractic care.    (2/20) [facility] ED: 1/22/25    Today, the patient rates their degree of pain as a 2 out of 10 on the numeric pain rating scale.     Asael returns for continued chiropractic care. He states that he responded well to his initial treatment. Today, he states that he would like me to focus on the elbow and the ankle. He states that he felt quite loose following his last appointment, with relief lasting all the way up until today. The patient denies any changes in health since his last encounter and will follow up as scheduled.    ___________________________________  Initial exam:   Darryl \"Haley Payne presents for evaluation and treatment for postural strain due to working from home. He states that he will notice tension in his shoulders after sitting for extended periods. He reports that these symptoms became more noticeable about a year ago. He reports that he presents with a secondary complaint of tingling along the lateral aspect of the left thigh. He states that these symptoms occur intermittently and are more noticeable with sitting and laying down. He states that he does have some lateral forearm pain along the right elbow. He states that in March 2024, he felt a \"pop\" when he was helping a friend move a couch. He notes that these symptoms have improved but he will still feel discomfort when performing a reverse curl.       Objective   Physical Exam  Neurological:      General: No focal deficit present.      Mental Status: He is alert and oriented to person, place, and time.      Cranial Nerves: No dysarthria or facial asymmetry.      Sensory: Sensation is intact.      Motor: Motor function is intact.      Coordination: Coordination is intact.      Gait: Gait is intact.       Palpation of the following region(s) revealed:  Cervical: Scalenes bilateral, muscular hypertonicity.  Upper trapezius " bilateral, muscular hypertonicity.  Levator scapulae bilateral, muscular hypertonicity.  Cervical paraspinals bilateral, muscular hypertonicity.  Thoracic: Thoracic paraspinals bilateral, muscular hypertonicity.  Middle trapezius bilateral, muscular hypertonicity.  Rhomboids bilateral, muscular hypertonicity.  Lumbar: Lumbar paraspinals bilateral, muscular hypertonicity.  Quadratus lumborum bilateral, muscular hypertonicity.  Gluteal bilateral, muscular hypertonicity.  Upper Extremity: Brachioradialis right, muscular hypertonicity.  Forearm extensors right, muscular hypertonicity.  Forearm flexors right, muscular hypertonicity.  Supinators right, muscular hypertonicity.  Pronators right, muscular hypertonicity.  Lower Extremity: Gastrocnemius / Soleus right, muscular hypertonicity.  Peroneal right, muscular hypertonicity.    Segmental Joint(s): Segmental joint dysfunction was assessed with motion palpation and is identified in the following areas:  Cervical : C4 C5  Thoracic : T4  Lumbopelvic / Sacral SIJ : L2, L5/S1, and R SIJ    Assessment/Plan   Today's Treatment Included: Spinal manipulation to the following regions of segmental dysfunction identified on examination, using age-appropriate and injury specific force. Segmental Joint(s) Cervical : C2 C4 Segmental Joint(s) Thoracic : T4, T5, and T8 Segmental Joint(s) Lumbopelvic/Sacral SIJ : L4, L5/S1, R SIJ, and L SIJ  Extremity manipulation performed to the following regions: Upper Extremities : R Elbow/Radioulnar joint and R Wrist Lower Extremities : R Hip, L Hip, R Knee, L Knee, R Ankle, and L Ankle  Chiropractic manipulation treatment techniques utilized: Diversified CMT, Pelvic drop table technique, Pelvic blocking technique, and Long-Axis Traction   Soft tissue mobilization techniques utilized during treatment: Pin and Stretch, IASTM/Graston, Cupping, and Manual Dynamic Stretching  Integrative Dry Needling (IDN) - Needles in/out:  10.  Neuromuscular  Re-Education (32019): 2 Units; Start time: 2:30p  End time: 3:00p      Soft-tissue mobilization was performed in the following areas:   Cervical paraspinal mm. bilateral, Upper Trapezius bilateral, and Levator Scap. bilateral  Thoracic Paraspinal mm. right, Middle Trapezius right, Rhomboids right, Pectoralis right, Subscapularis right, and Latissimus Dorsi right  Lumbar Paraspinal mm. bilateral and Gluteal mm. Glute. Max.  and Glute. Med. bilateral  Peroneal mm. bilateral, Calf mm. bilateral, Flexor mm. bilateral, and Achilles bilateral  Biceps right, Triceps right, Forearm Flexors right, Forearm Extensors right, Brachioradialis right, and Pronator Teres right    The patient tolerated today's treatment with little or no additional discomfort and was instructed to contact the office for questions or concerns.

## 2024-12-17 ENCOUNTER — APPOINTMENT (OUTPATIENT)
Dept: INTEGRATIVE MEDICINE | Facility: CLINIC | Age: 39
End: 2024-12-17
Payer: COMMERCIAL

## 2025-01-08 ENCOUNTER — APPOINTMENT (OUTPATIENT)
Dept: UROLOGY | Facility: HOSPITAL | Age: 40
End: 2025-01-08
Payer: COMMERCIAL

## 2025-01-09 ENCOUNTER — APPOINTMENT (OUTPATIENT)
Dept: INTEGRATIVE MEDICINE | Facility: CLINIC | Age: 40
End: 2025-01-09
Payer: COMMERCIAL

## 2025-04-02 ENCOUNTER — OFFICE VISIT (OUTPATIENT)
Dept: PRIMARY CARE | Facility: CLINIC | Age: 40
End: 2025-04-02
Payer: COMMERCIAL

## 2025-04-02 VITALS
DIASTOLIC BLOOD PRESSURE: 64 MMHG | HEART RATE: 81 BPM | TEMPERATURE: 98.3 F | OXYGEN SATURATION: 98 % | SYSTOLIC BLOOD PRESSURE: 118 MMHG | WEIGHT: 185.19 LBS | BODY MASS INDEX: 26.57 KG/M2

## 2025-04-02 DIAGNOSIS — J01.90 ACUTE SINUSITIS, RECURRENCE NOT SPECIFIED, UNSPECIFIED LOCATION: Primary | ICD-10-CM

## 2025-04-02 DIAGNOSIS — J01.80 ACUTE NON-RECURRENT SINUSITIS OF OTHER SINUS: ICD-10-CM

## 2025-04-02 PROCEDURE — 99213 OFFICE O/P EST LOW 20 MIN: CPT

## 2025-04-02 PROCEDURE — 1036F TOBACCO NON-USER: CPT

## 2025-04-02 RX ORDER — BENZONATATE 100 MG/1
100 CAPSULE ORAL 3 TIMES DAILY PRN
Qty: 21 CAPSULE | Refills: 0 | Status: SHIPPED | OUTPATIENT
Start: 2025-04-02 | End: 2025-04-09

## 2025-04-02 RX ORDER — AMOXICILLIN AND CLAVULANATE POTASSIUM 875; 125 MG/1; MG/1
875 TABLET, FILM COATED ORAL 2 TIMES DAILY
Qty: 14 TABLET | Refills: 0 | Status: SHIPPED | OUTPATIENT
Start: 2025-04-02 | End: 2025-04-09

## 2025-04-02 ASSESSMENT — ENCOUNTER SYMPTOMS
SHORTNESS OF BREATH: 1
RHINORRHEA: 1
TROUBLE SWALLOWING: 0
SINUS PRESSURE: 0
SINUS PAIN: 0
SORE THROAT: 0
COUGH: 1
OCCASIONAL FEELINGS OF UNSTEADINESS: 0
LOSS OF SENSATION IN FEET: 0
DEPRESSION: 0

## 2025-04-02 ASSESSMENT — PATIENT HEALTH QUESTIONNAIRE - PHQ9
1. LITTLE INTEREST OR PLEASURE IN DOING THINGS: NOT AT ALL
SUM OF ALL RESPONSES TO PHQ9 QUESTIONS 1 AND 2: 0
2. FEELING DOWN, DEPRESSED OR HOPELESS: NOT AT ALL

## 2025-04-02 NOTE — PROGRESS NOTES
Subjective   Patient ID: Darryl Payne is a 40 y.o. male who presents for Sinus Problem (Congestion) and Cough.    HPI   Darryl Payne is a 40 y.o. male who presents for cough.   About 1 1/2 week ago, got tamiflue, restarted 3 days  ago, no fever /chills, nasal congestion, dry hacking cough, now some phlem yellow from today morning, , almost a year ago had pneumonia after a flue, has chronic sinusitis in teen age time (high school years) treated with ABX, no body sick at home. No sob, chest tightness, pressure/chest pain. No flu shot last year. Tested positive for flu A , negative COVID last week. No history of allergy, but Hx of childhood asthma(age 13), use albuterol prn , last use yesterday.     Review of Systems   HENT:  Positive for postnasal drip and rhinorrhea. Negative for ear pain, sinus pressure, sinus pain, sore throat and trouble swallowing.    Respiratory:  Positive for cough and shortness of breath (mild).    All other systems reviewed and are negative.      Objective   /64   Pulse 81   Temp 36.8 °C (98.3 °F)   Wt 84 kg (185 lb 3 oz)   SpO2 98%   BMI 26.57 kg/m²     Physical Exam  HENT:      Head:      Comments: Sinuses nontender.     Right Ear: Tympanic membrane, ear canal and external ear normal.      Left Ear: Tympanic membrane, ear canal and external ear normal.      Nose: Congestion and rhinorrhea present.      Mouth/Throat:      Mouth: Mucous membranes are moist.      Pharynx: Oropharynx is clear.   Cardiovascular:      Rate and Rhythm: Normal rate and regular rhythm.      Pulses: Normal pulses.      Heart sounds: Normal heart sounds.   Pulmonary:      Effort: Pulmonary effort is normal.      Breath sounds: Normal breath sounds. No stridor. No wheezing, rhonchi or rales.   Abdominal:      Palpations: Abdomen is soft.   Musculoskeletal:      Right lower leg: No edema.      Left lower leg: No edema.   Neurological:      Mental Status: He is alert.         Assessment/Plan    Assessment & Plan  Acute sinusitis, recurrence not specified, unspecified location  -Has history of childhood asthma, was on albuterol, he still uses albuterol as needed basis  - PE: Significant for some nasal congestion and rhinorrhea, lung findings are within normal limit, nontender sinuses.  -Uses Flonase and albuterol on as needed basis  -Recommended to use it on daily basis for now.      - Ordered Augmentinfor 7 days.   - Tessalon lisa tid prn x 7 days.  -Return to clinic if symptoms not improved after 7 days.

## 2025-04-02 NOTE — ASSESSMENT & PLAN NOTE
-Has history of childhood asthma, was on albuterol, he still uses albuterol as needed basis  - PE: Significant for some nasal congestion and rhinorrhea, lung findings are within normal limit, nontender sinuses.  -Uses Flonase and albuterol on as needed basis  -Recommended to use it on daily basis for now.      - Ordered Augmentinfor 7 days.   - Tessalon lisa tid prn x 7 days.  -Return to clinic if symptoms not improved after 7 days.

## 2025-06-04 ENCOUNTER — PATIENT MESSAGE (OUTPATIENT)
Dept: UROLOGY | Facility: HOSPITAL | Age: 40
End: 2025-06-04
Payer: COMMERCIAL

## 2025-07-02 DIAGNOSIS — N52.9 ERECTILE DYSFUNCTION, UNSPECIFIED ERECTILE DYSFUNCTION TYPE: Primary | ICD-10-CM

## 2025-07-02 RX ORDER — TADALAFIL 5 MG/1
5 TABLET ORAL DAILY
Qty: 30 TABLET | Refills: 3 | Status: SHIPPED | OUTPATIENT
Start: 2025-07-02 | End: 2025-10-30

## 2025-07-10 ENCOUNTER — APPOINTMENT (OUTPATIENT)
Dept: UROLOGY | Facility: CLINIC | Age: 40
End: 2025-07-10
Payer: COMMERCIAL

## 2025-07-16 DIAGNOSIS — N52.9 ERECTILE DYSFUNCTION, UNSPECIFIED ERECTILE DYSFUNCTION TYPE: ICD-10-CM

## 2025-07-16 RX ORDER — TADALAFIL 5 MG/1
5 TABLET ORAL DAILY
Qty: 90 TABLET | Refills: 3 | Status: SHIPPED | OUTPATIENT
Start: 2025-07-16 | End: 2026-07-11

## 2025-07-21 ENCOUNTER — PATIENT MESSAGE (OUTPATIENT)
Dept: UROLOGY | Facility: HOSPITAL | Age: 40
End: 2025-07-21
Payer: COMMERCIAL

## 2025-07-21 DIAGNOSIS — N52.9 ERECTILE DYSFUNCTION, UNSPECIFIED ERECTILE DYSFUNCTION TYPE: ICD-10-CM

## 2025-07-21 RX ORDER — TADALAFIL 5 MG/1
5 TABLET ORAL
Qty: 90 TABLET | Refills: 1 | Status: SHIPPED | OUTPATIENT
Start: 2025-07-21 | End: 2025-07-23 | Stop reason: SDUPTHER

## 2025-07-23 DIAGNOSIS — N52.9 ERECTILE DYSFUNCTION, UNSPECIFIED ERECTILE DYSFUNCTION TYPE: Primary | ICD-10-CM

## 2025-07-23 RX ORDER — TADALAFIL 5 MG/1
5 TABLET ORAL
Qty: 90 TABLET | Refills: 2 | Status: SHIPPED | OUTPATIENT
Start: 2025-07-23 | End: 2026-04-19

## 2025-09-23 ENCOUNTER — APPOINTMENT (OUTPATIENT)
Dept: PRIMARY CARE | Facility: CLINIC | Age: 40
End: 2025-09-23
Payer: COMMERCIAL